# Patient Record
Sex: MALE | Race: WHITE | NOT HISPANIC OR LATINO | URBAN - METROPOLITAN AREA
[De-identification: names, ages, dates, MRNs, and addresses within clinical notes are randomized per-mention and may not be internally consistent; named-entity substitution may affect disease eponyms.]

---

## 2018-07-02 ENCOUNTER — EMERGENCY (EMERGENCY)
Facility: HOSPITAL | Age: 63
LOS: 0 days | Discharge: HOME | End: 2018-07-02
Attending: EMERGENCY MEDICINE | Admitting: EMERGENCY MEDICINE

## 2018-07-02 VITALS
OXYGEN SATURATION: 100 % | TEMPERATURE: 98 F | RESPIRATION RATE: 16 BRPM | DIASTOLIC BLOOD PRESSURE: 100 MMHG | SYSTOLIC BLOOD PRESSURE: 180 MMHG | HEART RATE: 78 BPM

## 2018-07-02 VITALS
TEMPERATURE: 98 F | HEART RATE: 84 BPM | OXYGEN SATURATION: 97 % | HEIGHT: 70 IN | DIASTOLIC BLOOD PRESSURE: 126 MMHG | SYSTOLIC BLOOD PRESSURE: 195 MMHG | RESPIRATION RATE: 18 BRPM | WEIGHT: 250 LBS

## 2018-07-02 DIAGNOSIS — B97.89 OTHER VIRAL AGENTS AS THE CAUSE OF DISEASES CLASSIFIED ELSEWHERE: ICD-10-CM

## 2018-07-02 DIAGNOSIS — J06.9 ACUTE UPPER RESPIRATORY INFECTION, UNSPECIFIED: ICD-10-CM

## 2018-07-02 DIAGNOSIS — R03.0 ELEVATED BLOOD-PRESSURE READING, WITHOUT DIAGNOSIS OF HYPERTENSION: ICD-10-CM

## 2018-07-02 DIAGNOSIS — I10 ESSENTIAL (PRIMARY) HYPERTENSION: ICD-10-CM

## 2018-07-02 NOTE — ED PROVIDER NOTE - CARE PLAN
Principal Discharge DX:	Elevated BP without diagnosis of hypertension  Secondary Diagnosis:	Viral upper respiratory tract infection

## 2018-07-02 NOTE — ED PROVIDER NOTE - OBJECTIVE STATEMENT
61 yo M no pmh presents with elevated bp. States that he has had a upper respiratory infection x 1 week with non productive cough and nasal congestion. Went to urgent care center yesterday was started on a z pack and tensilon pearls. While there was noted to have elevated BP which was still elevated today at a local pharmacy. Called his pmd who he had not seen since october 2016 who told him to come to the ED. no fever, no cp, no headache, no n/v/d, no abdominal pain. pmd is Dr. Deleon.

## 2018-07-02 NOTE — ED PROVIDER NOTE - NS ED ROS FT
Eyes:  No visual changes, eye pain or discharge.  ENMT:  no sore throat, + congestion and runny nose x 1 week   Cardiac:  No chest pain, SOB   Respiratory:  + cough x 1 week non productive. started on z pack yesterday   GI:  No nausea, vomiting, diarrhea or abdominal pain.  :  No dysuria, frequency or burning.  MS:  No joint pain or back pain.  Neuro:  No headache or weakness.    Skin:  No skin rash.   Endocrine: No history of thyroid disease or diabetes.

## 2018-07-02 NOTE — ED PROVIDER NOTE - PHYSICAL EXAMINATION
CONSTITUTIONAL: Well-developed; well-nourished; in no acute distress.   SKIN: warm, dry  HEAD: Normocephalic; atraumatic.  EYES: PERRL, no conjunctival erythema  ENT: No nasal discharge; airway clear. non erythematous pharynx, no exudates.   NECK: Supple; non tender.  CARD: S1, S2 normal;  Regular rate and rhythm.   RESP: No wheezes, rales or rhonchi.  ABD: soft ntnd  EXT: Normal ROM.    LYMPH: No acute cervical adenopathy.  NEURO: Alert, oriented, grossly unremarkable

## 2018-07-02 NOTE — ED ADULT TRIAGE NOTE - CHIEF COMPLAINT QUOTE
pt states I was at an urgent care center yesterday treated for a cold and they said my bp was high. I had it rechecked today at the pharmacy and it was still high 190/90.

## 2018-07-02 NOTE — ED PROVIDER NOTE - ATTENDING CONTRIBUTION TO CARE
63 y/o M with no PMH presents with cough, congestion and elevated BP reading. Patient had BP checked at the pharmacy today. Patient's PMD on vacation so patient came to the ED. He denies any complaints. No CP, SOB. EXAM: well appearing, lungs CTA, no mumurs, rubs. No edema. Plan: CX, ECG, will start hctz. Patient will follow-up with PMD next week.

## 2020-02-13 PROBLEM — Z00.00 ENCOUNTER FOR PREVENTIVE HEALTH EXAMINATION: Status: ACTIVE | Noted: 2020-02-13

## 2020-02-14 ENCOUNTER — APPOINTMENT (OUTPATIENT)
Dept: ORTHOPEDIC SURGERY | Facility: CLINIC | Age: 65
End: 2020-02-14
Payer: COMMERCIAL

## 2020-02-14 DIAGNOSIS — Z87.39 PERSONAL HISTORY OF OTHER DISEASES OF THE MUSCULOSKELETAL SYSTEM AND CONNECTIVE TISSUE: ICD-10-CM

## 2020-02-14 DIAGNOSIS — Z86.79 PERSONAL HISTORY OF OTHER DISEASES OF THE CIRCULATORY SYSTEM: ICD-10-CM

## 2020-02-14 DIAGNOSIS — Z86.69 PERSONAL HISTORY OF OTHER DISEASES OF THE NERVOUS SYSTEM AND SENSE ORGANS: ICD-10-CM

## 2020-02-14 PROCEDURE — 99204 OFFICE O/P NEW MOD 45 MIN: CPT

## 2020-02-14 RX ORDER — ACETAMINOPHEN 325 MG
TABLET ORAL
Refills: 0 | Status: ACTIVE | COMMUNITY

## 2020-02-14 RX ORDER — AMLODIPINE BESYLATE 5 MG/1
5 TABLET ORAL
Refills: 0 | Status: ACTIVE | COMMUNITY

## 2020-02-14 RX ORDER — LOSARTAN POTASSIUM 100 MG/1
100 TABLET, FILM COATED ORAL
Refills: 0 | Status: ACTIVE | COMMUNITY

## 2020-02-14 RX ORDER — MULTIVITAMIN
TABLET ORAL
Refills: 0 | Status: ACTIVE | COMMUNITY

## 2020-02-14 NOTE — ASSESSMENT
[FreeTextEntry1] : Pt comes in for severe arthritis in his left hip with tolerable symptoms. His symptoms are intermittent and he has been taking OTC NSAIDs on a PRN basis. His pain is not severe enough or frequent enough to justify hip replacement surgery. He would like to try a Rx NSAID to see if it will improve how he is feeling. We will therefore start him on Celebrex and see him back in 3-4 weeks.

## 2020-02-14 NOTE — HISTORY OF PRESENT ILLNESS
[Pain Location] : pain [] : left hip [7] : a maximum pain level of 7/10 [Worsening] : worsening [Walking] : walking [Standing] : standing [Constant] : ~He/She~ states the symptoms seem to be constant [de-identified] : 64 year old male presents to the office today complaining of bilateral knee pain, left knee more painful than right. He has xrays done at [facility] on his hip and his knee. Pt states there is pain in his groin that radiates down his left leg into his knee. Pt reports pain that is sharp in nature. He denies any buckling, locking, or swelling, but does report a loss of motion in the hip. He reports being able to ambulate about 2-3 blocks before pain stops him. He has pain and difficulty with negotiating stairs. He also reports difficulty with putting socks and shoes on and getting in and out of the car. He reports taking Ibuprofen and Tylenol for pain with only some relief. Pt reports doing physical therapy, but this was for his back. Pt is here today to discuss his options for pain relief.

## 2020-02-14 NOTE — PHYSICAL EXAM
[de-identified] : General appearance: well nourished and hydrated, pleasant, alert and oriented x 3, cooperative.\par Cardiovascular: no apparent abnormalities, no lower leg edema, no varicosities, pedal pulses are palpable.\par Neurologic: sensation is normal, no muscle weakness in upper or lower extremities\par Dermatologic no apparent skin lesions, moist, warm, no rash.\par Gait: nonantalgic.\par \par Left knee\par Inspection: no effusion or erythema.\par Wounds: none.\par Alignment: normal.\par Palpation: no specific tenderness on palpation.\par ROM:0-120 degrees \par Ligamentous laxity: all ligaments appear stable\par Muscle Test: good quad strength.\par \par Right knee\par Inspection: no effusion or erythema.\par Wounds: none.\par Alignment: normal.\par Palpation: no specific tenderness on palpation.\par ROM: 0-120 degrees \par Ligamentous laxity: all ligaments appear stable\par Muscle Test: good quad strength.\par \par Left hip\par Inspection: No swelling or ecchymosis.\par Wounds: none.\par Palpation: non-tender.\par Stability: no instability.\par Strength: 5/5 all motor groups.\par ROM: Flexion to 60 degrees, with a fixed ER contracture of 30 degrees\par Leg length: equal.\par \par Right hip\par Inspection: No swelling or ecchymosis.\par Wounds: none.\par Palpation: non-tender.\par Stability: no instability.\par Strength: 5/5 all motor groups.\par ROM: Flexion to 60 degrees, with a fixed ER contracture of 30 degrees\par Leg length: equal. [de-identified] : Radiographs from outside of the knees shows well maintained joint spaces \par \par Radiographs from outside of the hip shows complete loss of the joint space in the left hip

## 2020-03-20 ENCOUNTER — APPOINTMENT (OUTPATIENT)
Dept: ORTHOPEDIC SURGERY | Facility: CLINIC | Age: 65
End: 2020-03-20

## 2020-04-23 RX ORDER — CELECOXIB 200 MG/1
200 CAPSULE ORAL
Qty: 30 | Refills: 0 | Status: DISCONTINUED | COMMUNITY
Start: 2020-02-14 | End: 2020-04-23

## 2020-11-23 ENCOUNTER — APPOINTMENT (OUTPATIENT)
Dept: ORTHOPEDIC SURGERY | Facility: CLINIC | Age: 65
End: 2020-11-23
Payer: COMMERCIAL

## 2020-11-23 DIAGNOSIS — M25.561 PAIN IN RIGHT KNEE: ICD-10-CM

## 2020-11-23 DIAGNOSIS — M25.562 PAIN IN LEFT KNEE: ICD-10-CM

## 2020-11-23 PROCEDURE — 99213 OFFICE O/P EST LOW 20 MIN: CPT

## 2020-11-23 PROCEDURE — 73562 X-RAY EXAM OF KNEE 3: CPT | Mod: LT

## 2020-11-23 NOTE — HISTORY OF PRESENT ILLNESS
[Pain Location] : pain [de-identified] : 2/14/2020 - 64 year old male presents to the office today complaining of bilateral knee pain, left knee more painful than right. He has xrays done at [facility] on his hip and his knee. Pt states there is pain in his groin that radiates down his left leg into his knee. Pt reports pain that is sharp in nature. He denies any buckling, locking, or swelling, but does report a loss of motion in the hip. He reports being able to ambulate about 2-3 blocks before pain stops him. He has pain and difficulty with negotiating stairs. He also reports difficulty with putting socks and shoes on and getting in and out of the car. He reports taking Ibuprofen and Tylenol for pain with only some relief. Pt reports doing physical therapy, but this was for his back. Pt is here today to discuss his options for pain relief. \par \par 11/23/2020 - 64 year old male presents to the office today complaining of left knee pain that has been worsening in the last 3 months. When the patient was last here, we told him about his severely arthritic left hip, but patient denies any groin pain only left knee pain. Patient reports taking Ibuprofen for pain with relief. Patient is here today to discuss his options for pain relief.

## 2020-11-23 NOTE — PHYSICAL EXAM
[de-identified] : General appearance: well nourished and hydrated, pleasant, alert and oriented x 3, cooperative.\par Cardiovascular: no apparent abnormalities, no lower leg edema, no varicosities, pedal pulses are palpable.\par Neurologic: sensation is normal, no muscle weakness in upper or lower extremities\par Dermatologic no apparent skin lesions, moist, warm, no rash.\par Gait: nonantalgic.\par \par Left knee\par Inspection: no effusion or erythema.\par Wounds: none.\par Alignment: normal.\par Palpation: no specific tenderness on palpation.\par ROM:0-120 degrees \par Ligamentous laxity: all ligaments appear stable\par Muscle Test: good quad strength.\par \par Right knee\par Inspection: no effusion or erythema.\par Wounds: none.\par Alignment: normal.\par Palpation: no specific tenderness on palpation.\par ROM: 0-120 degrees \par Ligamentous laxity: all ligaments appear stable\par Muscle Test: good quad strength.\par \par Left hip\par Inspection: No swelling or ecchymosis.\par Wounds: none.\par Palpation: non-tender.\par Stability: no instability.\par Strength: 5/5 all motor groups.\par ROM: Flexion to 60 degrees, with a fixed ER contracture of 30 degrees\par Leg length: equal.\par \par Right hip\par Inspection: No swelling or ecchymosis.\par Wounds: none.\par Palpation: non-tender.\par Stability: no instability.\par Strength: 5/5 all motor groups.\par ROM: Flexion to 60 degrees, with a fixed ER contracture of 30 degrees\par Leg length: equal. [de-identified] : Radiographs done today AP lateral and skyline of the left knee shows well maintained joint spaces \par \par 2/14/2020- Radiographs from outside of the left hip shows complete loss of the joint space in the left hip

## 2020-11-23 NOTE — ASSESSMENT
[FreeTextEntry1] : 64 year old male presents for pain in the left knee which is responsive to 400 mg of ibuprofen each morning. The exam of the knee is benign and while ranging left hip i am unable to illicit any referred pain to the knee. Having said that i still feel his complaints of knee pain are coming from his arthritic left hip. He has end stage arthritis that is controlled with 400 mg of ibuprofen. I feel this is a reasonable Tx, he will check with PCP to make sure this is ok. We will see him back in 1 year.

## 2020-12-11 ENCOUNTER — APPOINTMENT (OUTPATIENT)
Dept: ORTHOPEDIC SURGERY | Facility: CLINIC | Age: 65
End: 2020-12-11
Payer: COMMERCIAL

## 2020-12-11 DIAGNOSIS — M16.12 UNILATERAL PRIMARY OSTEOARTHRITIS, LEFT HIP: ICD-10-CM

## 2020-12-11 PROCEDURE — 99072 ADDL SUPL MATRL&STAF TM PHE: CPT

## 2020-12-11 PROCEDURE — 99214 OFFICE O/P EST MOD 30 MIN: CPT

## 2020-12-11 NOTE — HISTORY OF PRESENT ILLNESS
[de-identified] : 2/14/2020 - 64 year old male presents to the office today complaining of bilateral knee pain, left knee more painful than right. He has xrays done at [facility] on his hip and his knee. Pt states there is pain in his groin that radiates down his left leg into his knee. Pt reports pain that is sharp in nature. He denies any buckling, locking, or swelling, but does report a loss of motion in the hip. He reports being able to ambulate about 2-3 blocks before pain stops him. He has pain and difficulty with negotiating stairs. He also reports difficulty with putting socks and shoes on and getting in and out of the car. He reports taking Ibuprofen and Tylenol for pain with only some relief. Pt reports doing physical therapy, but this was for his back. Pt is here today to discuss his options for pain relief. \par \par 11/23/2020 - 64 year old male presents to the office today complaining of left knee pain that has been worsening in the last 3 months. When the patient was last here, we told him about his severely arthritic left hip, but patient denies any groin pain only left knee pain. Patient reports taking Ibuprofen for pain with relief. Patient is here today to discuss his options for pain relief.\par \par 12/11/2020- 64 year old male presents to the office for follow up of his painful left arthritic hip.

## 2020-12-11 NOTE — PHYSICAL EXAM
[de-identified] : General appearance: well nourished and hydrated, pleasant, alert and oriented x 3, cooperative.\par Cardiovascular: no apparent abnormalities, no lower leg edema, no varicosities, pedal pulses are palpable.\par Neurologic: sensation is normal, no muscle weakness in upper or lower extremities\par Dermatologic no apparent skin lesions, moist, warm, no rash.\par Gait: nonantalgic.\par \par Left knee\par Inspection: no effusion or erythema.\par Wounds: none.\par Alignment: normal.\par Palpation: no specific tenderness on palpation.\par ROM:0-120 degrees \par Ligamentous laxity: all ligaments appear stable\par Muscle Test: good quad strength.\par \par Right knee\par Inspection: no effusion or erythema.\par Wounds: none.\par Alignment: normal.\par Palpation: no specific tenderness on palpation.\par ROM: 0-120 degrees \par Ligamentous laxity: all ligaments appear stable\par Muscle Test: good quad strength.\par \par Left hip\par Inspection: No swelling or ecchymosis.\par Wounds: none.\par Palpation: non-tender.\par Stability: no instability.\par Strength: 5/5 all motor groups.\par ROM: Flexion to 60 degrees, with a fixed ER contracture of 30 degrees\par Leg length: equal.\par \par Right hip\par Inspection: No swelling or ecchymosis.\par Wounds: none.\par Palpation: non-tender.\par Stability: no instability.\par Strength: 5/5 all motor groups.\par ROM: Flexion to 60 degrees, with a fixed ER contracture of 30 degrees\par Leg length: equal. [de-identified] : 11/23/2020- Radiographs done today AP lateral and skyline of the left knee shows well maintained joint spaces \par \par 2/14/2020- Radiographs from outside regional radiology of the left hip shows complete loss of the joint space in the left hip

## 2020-12-11 NOTE — ASSESSMENT
[FreeTextEntry1] : Pt presents for follow up on his left painful arthritic hip. The pain is in the hip down the thigh to the knee. It has progressed since the last time we saw him. We discussed the Tx options, Sx and non Sx. He has decided he would like to proceed with an elective LTH replacement. He will see him PCP for medical evaluation prior to S and be scheduled at his earliest possible convenience. \par \par The risks, benefits, alternatives of treatment, and aftercare precautions following joint replacement surgery were reviewed with the patient and all questions were answered. Models were used, radiographs reviewed and a brochure was given to the patient. The implant type utilized, including bearing surfaces fixation techniques were reviewed in detail, and the patient chose to proceed with elective joint replacement surgery. The patient's body mass index was recorded in my office notes, and for those patients whose  body mass index of greater than 30, I recommended that they review a weight reduction program with their internist. The importance of smoking cessation was reviewed with all patient's, and for those patients who still smoke, I recommended that they review a smoking cessation program with their internist.

## 2020-12-18 ENCOUNTER — RESULT REVIEW (OUTPATIENT)
Age: 65
End: 2020-12-18

## 2020-12-18 ENCOUNTER — OUTPATIENT (OUTPATIENT)
Dept: OUTPATIENT SERVICES | Facility: HOSPITAL | Age: 65
LOS: 1 days | Discharge: HOME | End: 2020-12-18
Payer: COMMERCIAL

## 2020-12-18 VITALS
RESPIRATION RATE: 16 BRPM | WEIGHT: 207.01 LBS | HEART RATE: 78 BPM | SYSTOLIC BLOOD PRESSURE: 172 MMHG | DIASTOLIC BLOOD PRESSURE: 92 MMHG | HEIGHT: 70 IN | OXYGEN SATURATION: 96 % | TEMPERATURE: 97 F

## 2020-12-18 VITALS
HEIGHT: 70 IN | TEMPERATURE: 97 F | RESPIRATION RATE: 16 BRPM | HEART RATE: 78 BPM | WEIGHT: 207.01 LBS | OXYGEN SATURATION: 96 % | SYSTOLIC BLOOD PRESSURE: 172 MMHG | DIASTOLIC BLOOD PRESSURE: 92 MMHG

## 2020-12-18 DIAGNOSIS — M16.12 UNILATERAL PRIMARY OSTEOARTHRITIS, LEFT HIP: ICD-10-CM

## 2020-12-18 DIAGNOSIS — Z01.818 ENCOUNTER FOR OTHER PREPROCEDURAL EXAMINATION: ICD-10-CM

## 2020-12-18 LAB
A1C WITH ESTIMATED AVERAGE GLUCOSE RESULT: 6 % — HIGH (ref 4–5.6)
ALBUMIN SERPL ELPH-MCNC: 4.3 G/DL — SIGNIFICANT CHANGE UP (ref 3.5–5.2)
ALP SERPL-CCNC: 125 U/L — HIGH (ref 30–115)
ALT FLD-CCNC: 11 U/L — SIGNIFICANT CHANGE UP (ref 0–41)
ANION GAP SERPL CALC-SCNC: 12 MMOL/L — SIGNIFICANT CHANGE UP (ref 7–14)
APTT BLD: 33.2 SEC — SIGNIFICANT CHANGE UP (ref 27–39.2)
AST SERPL-CCNC: 12 U/L — SIGNIFICANT CHANGE UP (ref 0–41)
BASOPHILS # BLD AUTO: 0.05 K/UL — SIGNIFICANT CHANGE UP (ref 0–0.2)
BASOPHILS NFR BLD AUTO: 0.7 % — SIGNIFICANT CHANGE UP (ref 0–1)
BILIRUB SERPL-MCNC: 0.6 MG/DL — SIGNIFICANT CHANGE UP (ref 0.2–1.2)
BLD GP AB SCN SERPL QL: SIGNIFICANT CHANGE UP
BUN SERPL-MCNC: 16 MG/DL — SIGNIFICANT CHANGE UP (ref 10–20)
CALCIUM SERPL-MCNC: 9.5 MG/DL — SIGNIFICANT CHANGE UP (ref 8.5–10.1)
CHLORIDE SERPL-SCNC: 102 MMOL/L — SIGNIFICANT CHANGE UP (ref 98–110)
CO2 SERPL-SCNC: 25 MMOL/L — SIGNIFICANT CHANGE UP (ref 17–32)
CREAT SERPL-MCNC: 0.7 MG/DL — SIGNIFICANT CHANGE UP (ref 0.7–1.5)
EOSINOPHIL # BLD AUTO: 0.47 K/UL — SIGNIFICANT CHANGE UP (ref 0–0.7)
EOSINOPHIL NFR BLD AUTO: 6.8 % — SIGNIFICANT CHANGE UP (ref 0–8)
ESTIMATED AVERAGE GLUCOSE: 126 MG/DL — HIGH (ref 68–114)
GLUCOSE SERPL-MCNC: 106 MG/DL — HIGH (ref 70–99)
HCT VFR BLD CALC: 44.7 % — SIGNIFICANT CHANGE UP (ref 42–52)
HGB BLD-MCNC: 14.4 G/DL — SIGNIFICANT CHANGE UP (ref 14–18)
IMM GRANULOCYTES NFR BLD AUTO: 0.1 % — SIGNIFICANT CHANGE UP (ref 0.1–0.3)
INR BLD: 0.97 RATIO — SIGNIFICANT CHANGE UP (ref 0.65–1.3)
LYMPHOCYTES # BLD AUTO: 1.32 K/UL — SIGNIFICANT CHANGE UP (ref 1.2–3.4)
LYMPHOCYTES # BLD AUTO: 19 % — LOW (ref 20.5–51.1)
MCHC RBC-ENTMCNC: 27.5 PG — SIGNIFICANT CHANGE UP (ref 27–31)
MCHC RBC-ENTMCNC: 32.2 G/DL — SIGNIFICANT CHANGE UP (ref 32–37)
MCV RBC AUTO: 85.3 FL — SIGNIFICANT CHANGE UP (ref 80–94)
MONOCYTES # BLD AUTO: 0.56 K/UL — SIGNIFICANT CHANGE UP (ref 0.1–0.6)
MONOCYTES NFR BLD AUTO: 8.1 % — SIGNIFICANT CHANGE UP (ref 1.7–9.3)
MRSA PCR RESULT.: NEGATIVE — SIGNIFICANT CHANGE UP
NEUTROPHILS # BLD AUTO: 4.53 K/UL — SIGNIFICANT CHANGE UP (ref 1.4–6.5)
NEUTROPHILS NFR BLD AUTO: 65.3 % — SIGNIFICANT CHANGE UP (ref 42.2–75.2)
NRBC # BLD: 0 /100 WBCS — SIGNIFICANT CHANGE UP (ref 0–0)
PLATELET # BLD AUTO: 252 K/UL — SIGNIFICANT CHANGE UP (ref 130–400)
POTASSIUM SERPL-MCNC: 4.6 MMOL/L — SIGNIFICANT CHANGE UP (ref 3.5–5)
POTASSIUM SERPL-SCNC: 4.6 MMOL/L — SIGNIFICANT CHANGE UP (ref 3.5–5)
PROT SERPL-MCNC: 7.2 G/DL — SIGNIFICANT CHANGE UP (ref 6–8)
PROTHROM AB SERPL-ACNC: 11.2 SEC — SIGNIFICANT CHANGE UP (ref 9.95–12.87)
RBC # BLD: 5.24 M/UL — SIGNIFICANT CHANGE UP (ref 4.7–6.1)
RBC # FLD: 12.8 % — SIGNIFICANT CHANGE UP (ref 11.5–14.5)
SODIUM SERPL-SCNC: 139 MMOL/L — SIGNIFICANT CHANGE UP (ref 135–146)
WBC # BLD: 6.94 K/UL — SIGNIFICANT CHANGE UP (ref 4.8–10.8)
WBC # FLD AUTO: 6.94 K/UL — SIGNIFICANT CHANGE UP (ref 4.8–10.8)

## 2020-12-18 PROCEDURE — 93010 ELECTROCARDIOGRAM REPORT: CPT

## 2020-12-18 PROCEDURE — 73502 X-RAY EXAM HIP UNI 2-3 VIEWS: CPT | Mod: 26,LT

## 2020-12-18 PROCEDURE — 71046 X-RAY EXAM CHEST 2 VIEWS: CPT | Mod: 26

## 2020-12-18 NOTE — H&P PST ADULT - NSICDXPASTMEDICALHX_GEN_ALL_CORE_FT
PAST MEDICAL HISTORY:  Hypertension     No pertinent past medical history     Osteoarthritis (arthritis due to wear and tear of joints)

## 2020-12-18 NOTE — H&P PST ADULT - HISTORY OF PRESENT ILLNESS
Anesthesia Alert  NO--Difficult Airway  NO--History of neck surgery or radiation  NO--Limited ROM of neck  NO--History of Malignant hyperthermia  NO--Personal or family history of Pseudocholinesterase deficiency  NO--Prior Anesthesia Complication  NO--Latex Allergy  NO--Loose teeth  NO--History of Rheumatoid Arthritis  NO--BECCA  NO--Other_____    PT is very nervous; reviewed instructions with wife and pt; went over pre-op gatorade instructions as well as meds/nasal culture    FOS: 1-2    mALLAMAPTI: 3    Denies exposure to covid-19    FROM    PT had pain for a few years that radiated down to left knee; takes anti-inflammatory for pain; pain can range from 2-8/10

## 2020-12-18 NOTE — H&P PST ADULT - HISTORY OF PRESENT ILLNESS
Anesthesia Alert  NO--Difficult Airway  NO--History of neck surgery or radiation  NO--Limited ROM of neck  NO--History of Malignant hyperthermia  NO--Personal or family history of Pseudocholinesterase deficiency  NO--Prior Anesthesia Complication  NO--Latex Allergy  NO--Loose teeth  NO--History of Rheumatoid Arthritis  NO--BECCA  NO--Other_____    PT is very nervous; wife is with patient  -reviewed hip replacement instructions; went over meds (tylenol and celebrex) and nasal swab culture    Denies every having surgery; denies exposure to COVID-19    Mallamapti: 3    FROM    FOS: 1-2    PT had hip pain for a few years; now it's affecting his ability to walk; pain radiates down to knee; sometimes pain is 2/10 other times it can reach 8/10; pt take anti-inflammatory (meloxicam) which helps

## 2020-12-18 NOTE — H&P PST ADULT - REASON FOR ADMISSION
64 y.o. M scheduled for left total hip replacement (ssi bundle) with Dr. Easton on 12/22/20 at [facility]

## 2020-12-18 NOTE — H&P PST ADULT - REASON FOR ADMISSION
64 y.o. M scheduled for left total hip replacement with Dr. Easton on 12/22/20 at [facility]

## 2020-12-19 ENCOUNTER — LABORATORY RESULT (OUTPATIENT)
Age: 65
End: 2020-12-19

## 2020-12-19 ENCOUNTER — OUTPATIENT (OUTPATIENT)
Dept: OUTPATIENT SERVICES | Facility: HOSPITAL | Age: 65
LOS: 1 days | Discharge: HOME | End: 2020-12-19

## 2020-12-19 DIAGNOSIS — Z11.59 ENCOUNTER FOR SCREENING FOR OTHER VIRAL DISEASES: ICD-10-CM

## 2020-12-19 PROBLEM — M19.90 UNSPECIFIED OSTEOARTHRITIS, UNSPECIFIED SITE: Chronic | Status: ACTIVE | Noted: 2020-12-18

## 2020-12-19 PROBLEM — I10 ESSENTIAL (PRIMARY) HYPERTENSION: Chronic | Status: ACTIVE | Noted: 2020-12-18

## 2020-12-22 ENCOUNTER — RESULT REVIEW (OUTPATIENT)
Age: 65
End: 2020-12-22

## 2020-12-22 ENCOUNTER — APPOINTMENT (OUTPATIENT)
Dept: ORTHOPEDIC SURGERY | Facility: HOSPITAL | Age: 65
End: 2020-12-22
Payer: COMMERCIAL

## 2020-12-22 ENCOUNTER — INPATIENT (INPATIENT)
Facility: HOSPITAL | Age: 65
LOS: 0 days | Discharge: HOME HEALTH | End: 2020-12-23
Attending: ORTHOPAEDIC SURGERY | Admitting: ORTHOPAEDIC SURGERY
Payer: COMMERCIAL

## 2020-12-22 DIAGNOSIS — M16.12 UNILATERAL PRIMARY OSTEOARTHRITIS, LEFT HIP: ICD-10-CM

## 2020-12-22 DIAGNOSIS — R73.9 HYPERGLYCEMIA, UNSPECIFIED: ICD-10-CM

## 2020-12-22 DIAGNOSIS — I10 ESSENTIAL (PRIMARY) HYPERTENSION: ICD-10-CM

## 2020-12-22 DIAGNOSIS — Z79.82 LONG TERM (CURRENT) USE OF ASPIRIN: ICD-10-CM

## 2020-12-22 LAB
BLD GP AB SCN SERPL QL: SIGNIFICANT CHANGE UP
GLUCOSE BLDC GLUCOMTR-MCNC: 115 MG/DL — HIGH (ref 70–99)
GLUCOSE BLDC GLUCOMTR-MCNC: 200 MG/DL — HIGH (ref 70–99)
HCT VFR BLD CALC: 38.5 % — LOW (ref 42–52)
HGB BLD-MCNC: 12.8 G/DL — LOW (ref 14–18)
MCHC RBC-ENTMCNC: 28.3 PG — SIGNIFICANT CHANGE UP (ref 27–31)
MCHC RBC-ENTMCNC: 33.2 G/DL — SIGNIFICANT CHANGE UP (ref 32–37)
MCV RBC AUTO: 85.2 FL — SIGNIFICANT CHANGE UP (ref 80–94)
NRBC # BLD: 0 /100 WBCS — SIGNIFICANT CHANGE UP (ref 0–0)
PLATELET # BLD AUTO: 226 K/UL — SIGNIFICANT CHANGE UP (ref 130–400)
RBC # BLD: 4.52 M/UL — LOW (ref 4.7–6.1)
RBC # FLD: 12.7 % — SIGNIFICANT CHANGE UP (ref 11.5–14.5)
WBC # BLD: 14.24 K/UL — HIGH (ref 4.8–10.8)
WBC # FLD AUTO: 14.24 K/UL — HIGH (ref 4.8–10.8)

## 2020-12-22 PROCEDURE — 88356 ANALYSIS NERVE: CPT | Mod: 26

## 2020-12-22 PROCEDURE — 99252 IP/OBS CONSLTJ NEW/EST SF 35: CPT

## 2020-12-22 PROCEDURE — 27130 TOTAL HIP ARTHROPLASTY: CPT | Mod: LT

## 2020-12-22 PROCEDURE — 88304 TISSUE EXAM BY PATHOLOGIST: CPT | Mod: 26

## 2020-12-22 PROCEDURE — 88311 DECALCIFY TISSUE: CPT | Mod: 26

## 2020-12-22 PROCEDURE — 88313 SPECIAL STAINS GROUP 2: CPT | Mod: 26

## 2020-12-22 PROCEDURE — 88342 IMHCHEM/IMCYTCHM 1ST ANTB: CPT | Mod: 26,59

## 2020-12-22 PROCEDURE — 88360 TUMOR IMMUNOHISTOCHEM/MANUAL: CPT | Mod: 26

## 2020-12-22 PROCEDURE — 88365 INSITU HYBRIDIZATION (FISH): CPT | Mod: 26

## 2020-12-22 RX ORDER — ONDANSETRON 8 MG/1
4 TABLET, FILM COATED ORAL EVERY 6 HOURS
Refills: 0 | Status: DISCONTINUED | OUTPATIENT
Start: 2020-12-22 | End: 2020-12-23

## 2020-12-22 RX ORDER — SODIUM CHLORIDE 9 MG/ML
1000 INJECTION INTRAMUSCULAR; INTRAVENOUS; SUBCUTANEOUS
Refills: 0 | Status: DISCONTINUED | OUTPATIENT
Start: 2020-12-22 | End: 2020-12-23

## 2020-12-22 RX ORDER — ONDANSETRON 8 MG/1
4 TABLET, FILM COATED ORAL ONCE
Refills: 0 | Status: DISCONTINUED | OUTPATIENT
Start: 2020-12-22 | End: 2020-12-22

## 2020-12-22 RX ORDER — TRAMADOL HYDROCHLORIDE 50 MG/1
50 TABLET ORAL EVERY 4 HOURS
Refills: 0 | Status: DISCONTINUED | OUTPATIENT
Start: 2020-12-22 | End: 2020-12-23

## 2020-12-22 RX ORDER — PANTOPRAZOLE SODIUM 20 MG/1
40 TABLET, DELAYED RELEASE ORAL
Refills: 0 | Status: DISCONTINUED | OUTPATIENT
Start: 2020-12-22 | End: 2020-12-23

## 2020-12-22 RX ORDER — LOSARTAN POTASSIUM 100 MG/1
0 TABLET, FILM COATED ORAL
Qty: 0 | Refills: 0 | DISCHARGE

## 2020-12-22 RX ORDER — FERROUS SULFATE 325(65) MG
325 TABLET ORAL DAILY
Refills: 0 | Status: DISCONTINUED | OUTPATIENT
Start: 2020-12-22 | End: 2020-12-23

## 2020-12-22 RX ORDER — CELECOXIB 200 MG/1
200 CAPSULE ORAL EVERY 12 HOURS
Refills: 0 | Status: DISCONTINUED | OUTPATIENT
Start: 2020-12-23 | End: 2020-12-23

## 2020-12-22 RX ORDER — LOSARTAN POTASSIUM 100 MG/1
100 TABLET, FILM COATED ORAL DAILY
Refills: 0 | Status: DISCONTINUED | OUTPATIENT
Start: 2020-12-23 | End: 2020-12-23

## 2020-12-22 RX ORDER — DEXAMETHASONE 0.5 MG/5ML
4 ELIXIR ORAL ONCE
Refills: 0 | Status: COMPLETED | OUTPATIENT
Start: 2020-12-23 | End: 2020-12-23

## 2020-12-22 RX ORDER — CEFAZOLIN SODIUM 1 G
2000 VIAL (EA) INJECTION EVERY 8 HOURS
Refills: 0 | Status: COMPLETED | OUTPATIENT
Start: 2020-12-22 | End: 2020-12-22

## 2020-12-22 RX ORDER — ACETAMINOPHEN 500 MG
1000 TABLET ORAL ONCE
Refills: 0 | Status: COMPLETED | OUTPATIENT
Start: 2020-12-22 | End: 2020-12-22

## 2020-12-22 RX ORDER — ACETAMINOPHEN 500 MG
650 TABLET ORAL EVERY 6 HOURS
Refills: 0 | Status: DISCONTINUED | OUTPATIENT
Start: 2020-12-22 | End: 2020-12-23

## 2020-12-22 RX ORDER — CHLORHEXIDINE GLUCONATE 213 G/1000ML
1 SOLUTION TOPICAL
Refills: 0 | Status: DISCONTINUED | OUTPATIENT
Start: 2020-12-22 | End: 2020-12-23

## 2020-12-22 RX ORDER — AMLODIPINE BESYLATE 2.5 MG/1
0 TABLET ORAL
Qty: 0 | Refills: 0 | DISCHARGE

## 2020-12-22 RX ORDER — ASPIRIN/CALCIUM CARB/MAGNESIUM 324 MG
81 TABLET ORAL EVERY 12 HOURS
Refills: 0 | Status: DISCONTINUED | OUTPATIENT
Start: 2020-12-22 | End: 2020-12-23

## 2020-12-22 RX ORDER — AMLODIPINE BESYLATE 2.5 MG/1
5 TABLET ORAL DAILY
Refills: 0 | Status: DISCONTINUED | OUTPATIENT
Start: 2020-12-23 | End: 2020-12-23

## 2020-12-22 RX ORDER — SODIUM CHLORIDE 9 MG/ML
1000 INJECTION, SOLUTION INTRAVENOUS
Refills: 0 | Status: DISCONTINUED | OUTPATIENT
Start: 2020-12-22 | End: 2020-12-22

## 2020-12-22 RX ORDER — HYDROMORPHONE HYDROCHLORIDE 2 MG/ML
0.5 INJECTION INTRAMUSCULAR; INTRAVENOUS; SUBCUTANEOUS
Refills: 0 | Status: DISCONTINUED | OUTPATIENT
Start: 2020-12-22 | End: 2020-12-22

## 2020-12-22 RX ORDER — SENNA PLUS 8.6 MG/1
2 TABLET ORAL AT BEDTIME
Refills: 0 | Status: DISCONTINUED | OUTPATIENT
Start: 2020-12-22 | End: 2020-12-23

## 2020-12-22 RX ORDER — CELECOXIB 200 MG/1
400 CAPSULE ORAL ONCE
Refills: 0 | Status: COMPLETED | OUTPATIENT
Start: 2020-12-22 | End: 2020-12-22

## 2020-12-22 RX ORDER — POLYETHYLENE GLYCOL 3350 17 G/17G
17 POWDER, FOR SOLUTION ORAL AT BEDTIME
Refills: 0 | Status: DISCONTINUED | OUTPATIENT
Start: 2020-12-22 | End: 2020-12-23

## 2020-12-22 RX ORDER — KETOROLAC TROMETHAMINE 30 MG/ML
15 SYRINGE (ML) INJECTION EVERY 6 HOURS
Refills: 0 | Status: DISCONTINUED | OUTPATIENT
Start: 2020-12-22 | End: 2020-12-23

## 2020-12-22 RX ADMIN — Medication 100 MILLIGRAM(S): at 16:25

## 2020-12-22 RX ADMIN — Medication 1000 MILLIGRAM(S): at 12:45

## 2020-12-22 RX ADMIN — Medication 650 MILLIGRAM(S): at 17:25

## 2020-12-22 RX ADMIN — Medication 81 MILLIGRAM(S): at 22:53

## 2020-12-22 RX ADMIN — SODIUM CHLORIDE 100 MILLILITER(S): 9 INJECTION INTRAMUSCULAR; INTRAVENOUS; SUBCUTANEOUS at 17:48

## 2020-12-22 RX ADMIN — CELECOXIB 400 MILLIGRAM(S): 200 CAPSULE ORAL at 06:29

## 2020-12-22 RX ADMIN — Medication 100 MILLIGRAM(S): at 23:01

## 2020-12-22 RX ADMIN — Medication 1000 MILLIGRAM(S): at 06:29

## 2020-12-22 RX ADMIN — Medication 650 MILLIGRAM(S): at 23:02

## 2020-12-22 RX ADMIN — CELECOXIB 400 MILLIGRAM(S): 200 CAPSULE ORAL at 12:45

## 2020-12-22 RX ADMIN — Medication 15 MILLIGRAM(S): at 17:48

## 2020-12-22 RX ADMIN — Medication 15 MILLIGRAM(S): at 23:02

## 2020-12-22 RX ADMIN — Medication 650 MILLIGRAM(S): at 23:01

## 2020-12-22 RX ADMIN — SODIUM CHLORIDE 75 MILLILITER(S): 9 INJECTION, SOLUTION INTRAVENOUS at 13:32

## 2020-12-22 RX ADMIN — Medication 15 MILLIGRAM(S): at 17:34

## 2020-12-22 RX ADMIN — Medication 325 MILLIGRAM(S): at 17:25

## 2020-12-22 RX ADMIN — Medication 15 MILLIGRAM(S): at 23:01

## 2020-12-22 RX ADMIN — PANTOPRAZOLE SODIUM 40 MILLIGRAM(S): 20 TABLET, DELAYED RELEASE ORAL at 17:25

## 2020-12-22 NOTE — PROGRESS NOTE ADULT - ASSESSMENT
s/p left thr pod 0     pain control   dvt proph   am labs   wbat   pt/ot   abx 24 hours   incentive   dispo planning

## 2020-12-22 NOTE — ASU PATIENT PROFILE, ADULT - PMH
Hypertension    No pertinent past medical history    Osteoarthritis (arthritis due to wear and tear of joints)

## 2020-12-22 NOTE — PHYSICAL THERAPY INITIAL EVALUATION ADULT - ADDITIONAL COMMENTS
Pt lives in [facility] with his wife. There are 6 steps with B rails on outside of house to enter, then 10 steps with rail on L side up to main level. Pt reports bedroom/bathroom on main level - no need to go up to loft. Pt reports ambulating without device prior to surgery - no RW or commode at home.

## 2020-12-22 NOTE — CHART NOTE - NSCHARTNOTEFT_GEN_A_CORE
PACU ANESTHESIA ADMISSION NOTE      Procedure: left THR  Post op diagnosis:  Left Hip OA    ____  Intubated  TV:______       Rate: ______      FiO2: ______    __x__  Patent Airway    __x__  Full return of protective reflexes    ___x_  Full recovery from anesthesia / back to baseline     Vitals:   T: 97.9          R:     18             BP:   93/52 repeat 105/56               Sat:    100%               P: 87      Mental Status:  ____x Awake   _____ Alert   _____x Drowsy   _____ Sedated    Nausea/Vomiting:  ___x NO  ______Yes,   See Post - Op Orders          Pain Scale (0-10):  ___0__    Treatment: _x___ None    ____ See Post - Op/PCA Orders    Post - Operative Fluids:   ____ Oral  x ____ See Post - Op Orders    Plan: Discharge:   ____Home       ___x__Floor     _____Critical Care    _____  Other:_________________    Comments: S/P CSE pt able to move BLE notes mild numbness BLE.  Epidural catheter removed with tip intact.

## 2020-12-22 NOTE — ASU PREOP CHECKLIST - BP NONINVASIVE SYSTOLIC (MM HG)
Patient has a family history of colon cancer.  She underwent her last colonoscopy on 6/22/2017 with sigmoid diverticulosis was found.  She did undergo an abdominal pelvic CT scan on 12/20/2019 showing a diffuse mild colitis. Patient returns for follow-up evaluation on 11/10/2020.  She was referred by her GYN, as she has lost 20 pounds unintentionally over the past 3 years.  Recent lab work did reveal a CRP of 2 and a BMP which was normal.  Overall she is feeling well, although she does complain of nausea in the morning, with occasional heartburn she denies any dysphagia, and her stools are normal on Colace.  She does have a family history of colon cancer, with 2 first cousins having colonic malignancy.  Any procedures will need to be done at the hospital, as she is a difficult intubation due to her anterior larynx and multiple neck surgeries.
162

## 2020-12-22 NOTE — CONSULT NOTE ADULT - SUBJECTIVE AND OBJECTIVE BOX
CC  S/P left Total hip replacement  HPI.  Patient reports left Hip pain is controlled.  Offers no other complaints    Constitutional: No fever, fatigue or weight loss.  Skin: No rash.  Eyes: No recent vision problems or eye pain.  ENT: No congestion, ear pain, or sore throat.  Endocrine: No thyroid problems.  Cardiovascular: No chest pain or palpation.  Respiratory: No cough, shortness of breath, congestion, or wheezing.  Gastrointestinal: No abdominal pain, nausea, vomiting, or diarrhea.  Genitourinary: No dysuria.  Musculoskeletal: No joint swelling.  Neurologic: No headache.           Allergies/Medications:   Allergies:        Allergies:  	No Known Allergies:     Home Medications:   * Patient Currently Takes Medications as of 18-Dec-2020 07:31 documented in Structured Notes  · 	losartan: Last Dose Taken:  , tab(s) orally once a day  · 	amLODIPine: Last Dose Taken:  , tab(s) orally once a day  · 	Aspir 81 oral delayed release tablet: Last Dose Taken:  , 1 tab(s) orally once a day  · 	Multiple Vitamins oral tablet: Last Dose Taken:  , 1 tab(s) orally once a day    PMH/PSH/FH/SH:    Past Medical, Past Surgical, and Family History:  PAST MEDICAL HISTORY:  Hypertension     No pertinent past medical history     Osteoarthritis (arthritis due to wear and tear of joints).     PAST SURGICAL HISTORY:  No significant past surgical history.     Social History:  · Marital Status	  · Lives With	spouse     Substance Use History:  · Substance Use	never used     Alcohol Use History:  · Have you ever consumed alcohol	never     Tobacco Usage:  · Tobacco Usage: Never smoker    Vital Signs Last 24 Hrs  T(C): 36.4 (22 Dec 2020 13:51), Max: 36.6 (22 Dec 2020 06:30)  T(F): 97.6 (22 Dec 2020 13:51), Max: 97.9 (22 Dec 2020 12:36)  HR: 82 (22 Dec 2020 15:38) (76 - 88)  BP: 132/74 (22 Dec 2020 15:38) (93/52 - 162/82)  BP(mean): --  RR: 18 (22 Dec 2020 15:38) (15 - 28)  SpO2: 98% (22 Dec 2020 13:51) (98% - 100%)        PHYSICAL EXAM-  GENERAL: NAD, well-groomed, well-developed  HEAD:  Atraumatic, Normocephalic  EYES: EOMI, PERRLA, conjunctiva and sclera clear  NECK: Supple, No JVD, Normal thyroid  NERVOUS SYSTEM:  Alert & Oriented X3, Motor Strength 5/5 B/L upper and lower extremities; DTRs 2+ intact and symmetric  CHEST/LUNG: Clear to percussion bilaterally; No rales, rhonchi, wheezing, or rubs  HEART: Regular rate and rhythm; No murmurs, rubs, or gallops  ABDOMEN: Soft, Nontender, Nondistended; Bowel sounds present  EXTREMITIES:  2+ Peripheral Pulses, No clubbing, cyanosis, or edema  SKIN: No rashes or lesions      Imaging Personally Reviewed:     [x ] YES  [ ] NO    Consultant(s) Notes Reviewed:  [x ] YES  [ ] NO    Care Discussed with Consultants/Other Providers [x ] YES  [ ] No medical contraindication for discharge

## 2020-12-22 NOTE — CONSULT NOTE ADULT - ASSESSMENT
Patient is 65 yo male presenting with      1. S/P Left Total hip replacement   Continue with pain management, DVT proph, and wound care as per Ortho.  PT/OT    2. HTN/HLD  -Continue with home medications  -Stable    #Progress Note Handoff  Pending (specify):  further care as per ortho  Family discussion:  plan of care was discussed with patient  in details.  all questions were answered.  seems to understand, and in agreement  Disposition:  unknown   Patient is 63 yo male presenting with      1. S/P Left Total hip replacement   Continue with pain management, DVT proph, and wound care as per Ortho.  PT/OT    2. HTN  -Continue with home medications  -Stable    #Progress Note Handoff  Pending (specify):  further care as per ortho  Family discussion:  plan of care was discussed with patient  in details.  all questions were answered.  seems to understand, and in agreement  Disposition:  unknown   Patient is 63 yo male presenting with      1. S/P Left Total hip replacement   Continue with pain management, DVT proph, and wound care as per Ortho.  PT/OT    2. HTN  -Continue with home medications  -Stable    3. Hyperglycemia  -HBA1C is 6.0  -Monitor POC    #Progress Note Handoff  Pending (specify):  further care as per ortho  Family discussion:  plan of care was discussed with patient  in details.  all questions were answered.  seems to understand, and in agreement  Disposition:  unknown

## 2020-12-22 NOTE — PHYSICAL THERAPY INITIAL EVALUATION ADULT - GENERAL OBSERVATIONS, REHAB EVAL
Pt encountered sitting in chair bedside, NAD, +Aquacel dressing L hip, ok to be seen by PT as confirmed by RN and pt agreeable. +chart reviewed

## 2020-12-22 NOTE — PROGRESS NOTE ADULT - SUBJECTIVE AND OBJECTIVE BOX
s/p left thr pod 0   pt seen at bedside is ambulating to bathroom and voiding   doing well no complaints pain controlled     Vital Signs Last 24 Hrs  T(C): 36.4 (22 Dec 2020 13:51), Max: 36.6 (22 Dec 2020 06:30)  T(F): 97.6 (22 Dec 2020 13:51), Max: 97.9 (22 Dec 2020 12:36)  HR: 82 (22 Dec 2020 15:38) (76 - 88)  BP: 132/74 (22 Dec 2020 15:38) (93/52 - 162/82)  BP(mean): --  RR: 18 (22 Dec 2020 15:38) (15 - 28)  SpO2: 98% (22 Dec 2020 13:51) (98% - 100%)    Vital Signs Last 24 Hrs  T(C): 36.4 (22 Dec 2020 13:51), Max: 36.6 (22 Dec 2020 06:30)  T(F): 97.6 (22 Dec 2020 13:51), Max: 97.9 (22 Dec 2020 12:36)  HR: 82 (22 Dec 2020 15:38) (76 - 88)  BP: 132/74 (22 Dec 2020 15:38) (93/52 - 162/82)  BP(mean): --  RR: 18 (22 Dec 2020 15:38) (15 - 28)  SpO2: 98% (22 Dec 2020 13:51) (98% - 100%)                          12.8   14.24 )-----------( 226      ( 22 Dec 2020 13:24 )             38.5       left hip: dressing c//d/i   nvid df/pf intact   no calf pain or tenderness   magdalene x 2

## 2020-12-23 ENCOUNTER — TRANSCRIPTION ENCOUNTER (OUTPATIENT)
Age: 65
End: 2020-12-23

## 2020-12-23 VITALS
RESPIRATION RATE: 16 BRPM | TEMPERATURE: 99 F | SYSTOLIC BLOOD PRESSURE: 126 MMHG | DIASTOLIC BLOOD PRESSURE: 62 MMHG | HEART RATE: 77 BPM

## 2020-12-23 LAB — GLUCOSE BLDC GLUCOMTR-MCNC: 152 MG/DL — HIGH (ref 70–99)

## 2020-12-23 PROCEDURE — 99233 SBSQ HOSP IP/OBS HIGH 50: CPT

## 2020-12-23 RX ORDER — CELECOXIB 200 MG/1
1 CAPSULE ORAL
Qty: 28 | Refills: 0
Start: 2020-12-23 | End: 2021-01-05

## 2020-12-23 RX ORDER — ASPIRIN/CALCIUM CARB/MAGNESIUM 324 MG
1 TABLET ORAL
Qty: 84 | Refills: 0
Start: 2020-12-23 | End: 2021-02-02

## 2020-12-23 RX ORDER — SENNA PLUS 8.6 MG/1
2 TABLET ORAL
Qty: 0 | Refills: 0 | DISCHARGE
Start: 2020-12-23

## 2020-12-23 RX ORDER — PANTOPRAZOLE SODIUM 20 MG/1
1 TABLET, DELAYED RELEASE ORAL
Qty: 30 | Refills: 1
Start: 2020-12-23 | End: 2021-02-20

## 2020-12-23 RX ORDER — TRAMADOL HYDROCHLORIDE 50 MG/1
1 TABLET ORAL
Qty: 42 | Refills: 0
Start: 2020-12-23 | End: 2020-12-29

## 2020-12-23 RX ORDER — ASPIRIN/CALCIUM CARB/MAGNESIUM 324 MG
1 TABLET ORAL
Qty: 0 | Refills: 0 | DISCHARGE

## 2020-12-23 RX ORDER — ACETAMINOPHEN 500 MG
2 TABLET ORAL
Qty: 0 | Refills: 0 | DISCHARGE
Start: 2020-12-23

## 2020-12-23 RX ADMIN — Medication 650 MILLIGRAM(S): at 11:30

## 2020-12-23 RX ADMIN — LOSARTAN POTASSIUM 100 MILLIGRAM(S): 100 TABLET, FILM COATED ORAL at 05:12

## 2020-12-23 RX ADMIN — Medication 650 MILLIGRAM(S): at 05:12

## 2020-12-23 RX ADMIN — Medication 4 MILLIGRAM(S): at 08:26

## 2020-12-23 RX ADMIN — Medication 650 MILLIGRAM(S): at 11:22

## 2020-12-23 RX ADMIN — Medication 15 MILLIGRAM(S): at 05:12

## 2020-12-23 RX ADMIN — PANTOPRAZOLE SODIUM 40 MILLIGRAM(S): 20 TABLET, DELAYED RELEASE ORAL at 05:12

## 2020-12-23 RX ADMIN — Medication 650 MILLIGRAM(S): at 05:11

## 2020-12-23 RX ADMIN — SODIUM CHLORIDE 100 MILLILITER(S): 9 INJECTION INTRAMUSCULAR; INTRAVENOUS; SUBCUTANEOUS at 05:12

## 2020-12-23 RX ADMIN — Medication 15 MILLIGRAM(S): at 11:22

## 2020-12-23 RX ADMIN — Medication 325 MILLIGRAM(S): at 11:22

## 2020-12-23 RX ADMIN — AMLODIPINE BESYLATE 5 MILLIGRAM(S): 2.5 TABLET ORAL at 05:12

## 2020-12-23 RX ADMIN — Medication 15 MILLIGRAM(S): at 05:11

## 2020-12-23 RX ADMIN — Medication 81 MILLIGRAM(S): at 09:22

## 2020-12-23 RX ADMIN — Medication 1 TABLET(S): at 11:22

## 2020-12-23 RX ADMIN — Medication 15 MILLIGRAM(S): at 11:30

## 2020-12-23 NOTE — DISCHARGE NOTE PROVIDER - NSDCFUSCHEDAPPT_GEN_ALL_CORE_FT
INGRID JENSEN ; 01/05/2021 ; CHELSY [facility] [address] ; 02/02/2021 ; CHELSY Sol[facility] [address]

## 2020-12-23 NOTE — DISCHARGE NOTE PROVIDER - NSDCMRMEDTOKEN_GEN_ALL_CORE_FT
acetaminophen 325 mg oral tablet: 2 tab(s) orally every 6 hours x 14 days  amLODIPine: tab(s) orally once a day  aspirin 81 mg oral delayed release tablet: 1 tab(s) orally every 12 hours  celecoxib 200 mg oral capsule: 1 cap(s) orally every 12 hours  losartan: tab(s) orally once a day  Multiple Vitamins oral tablet: 1 tab(s) orally once a day  pantoprazole 40 mg oral delayed release tablet: 1 tab(s) orally once a day (before a meal)  senna oral tablet: 2 tab(s) orally once a day (at bedtime)  traMADol 50 mg oral tablet: 1 tab(s) orally every 4 hours, As needed, Severe Pain (7 - 10) MDD:6 tablets

## 2020-12-23 NOTE — PROGRESS NOTE ADULT - ASSESSMENT
64M PMHx HTN, osteoarthritis here for elective L MARILEE.    #S/p L MARILEE  pain control  PT  discharge planning  #HTN  norvasc 5  losartan 100  #DVT ppx  per ortho    Leydi  [id number]

## 2020-12-23 NOTE — DISCHARGE NOTE PROVIDER - HOSPITAL COURSE
64 year old male with a past medical history of HTN , admitted for an elective Total Hip Arthroplasty . Routine post operative course.

## 2020-12-23 NOTE — DISCHARGE NOTE PROVIDER - CARE PROVIDER_API CALL
Taye Easton  Orthopedic Surgery  [address], [address]  [place] [address]  Phone: [phone]  Fax: [phone]  Follow Up Time:

## 2020-12-23 NOTE — OCCUPATIONAL THERAPY INITIAL EVALUATION ADULT - GENERAL OBSERVATIONS, REHAB EVAL
09:15-09:38 chart reviewed, ok to treat by Occupational Therapist as confirmed by RN, Pt received seated in bedside chair +aquacel dressing left hip in NAD. Pt reports 6/10 pain but is in agreement with OT IE

## 2020-12-23 NOTE — DISCHARGE NOTE NURSING/CASE MANAGEMENT/SOCIAL WORK - PATIENT PORTAL LINK FT
You can access the FollowMyHealth Patient Portal offered by [facility] by registering at the following website: [email or web address]. By joining [employer]’s FollowMyHealth portal, you will also be able to view your health information using other applications (apps) compatible with our system.

## 2020-12-23 NOTE — DISCHARGE NOTE PROVIDER - NSDCFUADDINST_GEN_ALL_CORE_FT
Keep surgical site clean and dry, may remove dressing in 7  days . Call                if any wound drainage, redness , increasing pain, fevers over 101 or if you have any questions or concerns.     You may shower with the bandage on and once it is removed. Once it is removed  , do not scrub surgical site. Do not apply any lotions/moisturizers/creams to surgical site.    Call to make your  post op appointment if you do not have one already.     After completing 6 weeks of aspirin 81 mg twice daily , you may resume your usual once daily dosage.

## 2020-12-23 NOTE — PROGRESS NOTE ADULT - SUBJECTIVE AND OBJECTIVE BOX
INTERVAL HPI/OVERNIGHT EVENTS:    SUBJECTIVE: Patient seen and examined at bedside.     no cp, sob, abd pain, fever  L hip pain, sharp, nonradiating, worsen with movement    OBJECTIVE:    VITAL SIGNS:  Vital Signs Last 24 Hrs  T(C): 37.1 (23 Dec 2020 10:00), Max: 37.1 (23 Dec 2020 10:00)  T(F): 98.7 (23 Dec 2020 10:00), Max: 98.7 (23 Dec 2020 10:00)  HR: 77 (23 Dec 2020 10:00) (77 - 86)  BP: 126/62 (23 Dec 2020 10:00) (119/62 - 147/69)  BP(mean): --  RR: 16 (23 Dec 2020 10:00) (16 - 18)  SpO2: --      PHYSICAL EXAM:    General: NAD  HEENT: NC/AT; PERRL, clear conjunctiva  Neck: supple  Respiratory: CTA b/l  Cardiovascular: +S1/S2; RRR  Abdomen: soft, NT/ND; +BS x4  Extremities: WWP, 2+ peripheral pulses b/l; no LE edema  Skin: bandaged L hip  Neurological:    MEDICATIONS:  MEDICATIONS  (STANDING):  acetaminophen   Tablet .. 650 milliGRAM(s) Oral every 6 hours  amLODIPine   Tablet 5 milliGRAM(s) Oral daily  aspirin enteric coated 81 milliGRAM(s) Oral every 12 hours  celecoxib 200 milliGRAM(s) Oral every 12 hours  chlorhexidine 4% Liquid 1 Application(s) Topical <User Schedule>  ferrous    sulfate 325 milliGRAM(s) Oral daily  losartan 100 milliGRAM(s) Oral daily  multivitamin 1 Tablet(s) Oral daily  multivitamin 1 Tablet(s) Oral daily  pantoprazole    Tablet 40 milliGRAM(s) Oral before breakfast  polyethylene glycol 3350 17 Gram(s) Oral at bedtime  senna 2 Tablet(s) Oral at bedtime    MEDICATIONS  (PRN):  aluminum hydroxide/magnesium hydroxide/simethicone Suspension 30 milliLiter(s) Oral four times a day PRN Indigestion  ondansetron Injectable 4 milliGRAM(s) IV Push every 6 hours PRN Nausea and/or Vomiting  traMADol 50 milliGRAM(s) Oral every 4 hours PRN Severe Pain (7 - 10)      ALLERGIES:  Allergies    No Known Allergies    Intolerances        LABS:                        12.8   14.24 )-----------( 226      ( 22 Dec 2020 13:24 )             38.5     Hemoglobin: 12.8 g/dL (12-22 @ 13:24)    CBC Full  -  ( 22 Dec 2020 13:24 )  WBC Count : 14.24 K/uL  RBC Count : 4.52 M/uL  Hemoglobin : 12.8 g/dL  Hematocrit : 38.5 %  Platelet Count - Automated : 226 K/uL  Mean Cell Volume : 85.2 fL  Mean Cell Hemoglobin : 28.3 pg  Mean Cell Hemoglobin Concentration : 33.2 g/dL  Auto Neutrophil # : x  Auto Lymphocyte # : x  Auto Monocyte # : x  Auto Eosinophil # : x  Auto Basophil # : x  Auto Neutrophil % : x  Auto Lymphocyte % : x  Auto Monocyte % : x  Auto Eosinophil % : x  Auto Basophil % : x          Creatinine Trend: 0.7<--        hs Troponin:              CSF:                      EKG:   MICROBIOLOGY:    IMAGING:      Labs, imaging, EKG personally reviewed    RADIOLOGY & ADDITIONAL TESTS: Reviewed.

## 2020-12-31 LAB — SURGICAL PATHOLOGY STUDY: SIGNIFICANT CHANGE UP

## 2021-01-05 ENCOUNTER — APPOINTMENT (OUTPATIENT)
Dept: ORTHOPEDIC SURGERY | Facility: CLINIC | Age: 66
End: 2021-01-05
Payer: COMMERCIAL

## 2021-01-05 DIAGNOSIS — Z98.890 OTHER SPECIFIED POSTPROCEDURAL STATES: ICD-10-CM

## 2021-01-05 DIAGNOSIS — Z51.89 ENCOUNTER FOR OTHER SPECIFIED AFTERCARE: ICD-10-CM

## 2021-01-05 PROCEDURE — 99024 POSTOP FOLLOW-UP VISIT: CPT

## 2021-01-05 NOTE — REASON FOR VISIT
[Post Operative Visit] : a post operative visit for [Artificial Hip Joint] : artificial hip joint [Joint Replacement Surgery, Aftercare] : joint replacement surgery, aftercare

## 2021-01-05 NOTE — HISTORY OF PRESENT ILLNESS
[de-identified] : 65 year old male s/p left total hip replacement presents to the office today for his 2 week follow up. Patient is ambulating with a walker today. He reports taking Tylenol for pain mostly and Tramadol at night only with good pain control. He has continued to do physical therapy at home and will be completing this within the week. Patient states he has been taking Aspirin 81 mg twice daily for DVT prophylaxis. Patient denies any fevers, chills, drainage from the incision site, chest pain, or shortness of breath.\par

## 2021-01-05 NOTE — PHYSICAL EXAM
[Walker] : ambulates with walker [Normal] : Alert and in no acute distress [de-identified] : Left Hip\par Inspection: No effusion, 2+ pitting edema about the left foot\par Wounds: Incision is clean and dry\par Alignment: Normal\par Stability: No instability\par Palpation: No specific tenderness on palpation\par Muscle Test: 5/5 All motor groups\par Leg examination: Calf is soft and non-tender. [de-identified] : Sensation grossly intact about bilateral lower extremities.\par

## 2021-01-05 NOTE — ASSESSMENT
[FreeTextEntry1] : 65 year old male approximately two weeks status post left total hip replacement. Pt is to continue Aspirin 81 mg twice daily for DVT prophylaxis. Pt is to follow up in four weeks for reassessment. In the interim, if he develops any fevers, erythema, drainage from the incision site, or any concerning symptoms, we will see him prior to his scheduled appointment.\par

## 2021-01-14 LAB
DNA PLOIDY SPEC FC-IMP: SIGNIFICANT CHANGE UP
DNA PLOIDY SPEC FC-IMP: SIGNIFICANT CHANGE UP

## 2021-02-02 ENCOUNTER — APPOINTMENT (OUTPATIENT)
Dept: ORTHOPEDIC SURGERY | Facility: CLINIC | Age: 66
End: 2021-02-02

## 2021-02-05 ENCOUNTER — APPOINTMENT (OUTPATIENT)
Dept: ORTHOPEDIC SURGERY | Facility: CLINIC | Age: 66
End: 2021-02-05
Payer: COMMERCIAL

## 2021-02-05 VITALS — TEMPERATURE: 98.3 F

## 2021-02-05 DIAGNOSIS — Z96.642 PRESENCE OF LEFT ARTIFICIAL HIP JOINT: ICD-10-CM

## 2021-02-05 PROCEDURE — 99024 POSTOP FOLLOW-UP VISIT: CPT

## 2021-02-05 PROCEDURE — 73502 X-RAY EXAM HIP UNI 2-3 VIEWS: CPT | Mod: LT

## 2021-02-05 RX ORDER — MELOXICAM 15 MG/1
15 TABLET ORAL
Qty: 30 | Refills: 0 | Status: DISCONTINUED | COMMUNITY
Start: 2020-03-19 | End: 2021-02-05

## 2021-02-05 RX ORDER — ACETAMINOPHEN 500 MG
500 TABLET ORAL
Refills: 0 | Status: DISCONTINUED | COMMUNITY
End: 2021-02-05

## 2021-02-05 RX ORDER — IBUPROFEN 200 MG/1
200 CAPSULE, LIQUID FILLED ORAL
Refills: 0 | Status: DISCONTINUED | COMMUNITY
End: 2021-02-05

## 2021-02-05 RX ORDER — TRAMADOL HYDROCHLORIDE 50 MG/1
50 TABLET, COATED ORAL
Qty: 42 | Refills: 0 | Status: ACTIVE | COMMUNITY
Start: 2020-12-23

## 2021-02-05 NOTE — ASSESSMENT
[FreeTextEntry1] : S/p [facility] 12/22/20 , doing well. Complains of limited ROM and difficulty putting on shoes and socks. He is walking with out a limp and without a cane. He can go up and down the stairs. We will see him back in 3 months.

## 2021-02-05 NOTE — PHYSICAL EXAM
[de-identified] : Left Knee\par Inspection: no effusion or erythema.\par Wounds: well healed incision \par Alignment: normal.\par Palpation: no specific tenderness on palpation.\par ROM: Flexion to 70 degrees,ER 30,ABD 30\par Ligamentous laxity: all ligaments appear stable\par Muscle Test: good quad strength.\par Leg examination: calf is soft and non-tender. [de-identified] : Radiographs done today AP pelvis and lateral of the left hip shows the bony structures are intact , well aligned cementless total hip replacement. Severely arthritis right hip.

## 2021-03-22 ENCOUNTER — FORM ENCOUNTER (OUTPATIENT)
Age: 66
End: 2021-03-22

## 2021-04-09 ENCOUNTER — APPOINTMENT (OUTPATIENT)
Dept: ORTHOPEDIC SURGERY | Facility: CLINIC | Age: 66
End: 2021-04-09

## 2023-03-24 NOTE — PHYSICAL THERAPY INITIAL EVALUATION ADULT - GAIT DISTANCE, PT EVAL
Rx Refill Note  Requested Prescriptions     Pending Prescriptions Disp Refills   • Insulin Pen Needle (BD Pen Needle Rimma 2nd Gen) 32G X 4 MM misc [Pharmacy Med Name: BD Pen Needle Rimma 2nd Gen Miscellaneous 32G X 4 MM] 200 each 0     Sig: USE TWICE DAILY AS DIRECTED FOR INSULIN INJECTIONS      Last office visit with prescribing clinician: 12/6/2022     Next office visit with prescribing clinician: 4/12/2023                         Would you like a call back once the refill request has been completed: [] Yes [] No    If the office needs to give you a call back, can they leave a voicemail: [] Yes [] No    Vesna Steven MA  03/24/23, 09:40 EDT  
60' x 2

## 2024-06-18 NOTE — PHYSICAL THERAPY INITIAL EVALUATION ADULT - BALANCE DISTURBANCE, IDENTIFIED IMPAIRMENT CONTRIBUTE, REHAB EVAL
Discontinuing liver care coordination program as [place], plans to transfer care to new hepatologist.    Salome Marie, RN Care Coordinator  [facility]  Hepatology Clinic/Specialty Program     impaired coordination/impaired postural control/decreased strength

## 2025-06-17 NOTE — H&P PST ADULT - HEIGHT IN FEET
"I, Mekhi Morse MD, saw and evaluated the patient. I have discussed the patient with the resident and agree with the resident's findings, Plan of Care, and MDM as documented in the resident's note, except where noted. All available labs and Radiology studies were reviewed.  I was present for key portions of any procedure(s) performed by the resident and I was immediately available to provide assistance.    At this point I agree with the current assessment done in the Emergency Department.  I have conducted an independent evaluation of this patient a history and physical is as follows:    62 yo male with a history of major depressive disorder, PTSD, polysubstance abuse, CKD, GERD, DM, and hyperlipidemia presents to the ED for evaluation of an abnormal lab value. The patient was seen at a local clinic today for a routine checkup. The provider he saw noticed that outpatient blood work from 6/2 showed a potassium of 6.4 so the patient was instructed to come to the ED immediately for assessment. He has absolutely no complaints and says he feels \"fine\". Of note, he was in the ED on 6/3 and had repeat labs drawn --> potassium was 5.3 at that time. Apparently the clinic does not have access to these records. No chest pain, shortness of breath, nausea, vomiting, dizziness, or lightheadedness. No other specific complaints.    ROS: per resident physician note    Gen: NAD, AA&Ox3  HEENT: PERRL, EOMI  Neck: supple  CV: RRR  Lungs: CTA B/L  Abdomen: soft, NT/ND  Ext: no swelling or deformity  Neuro: 5/5 strength all extremities, sensation grossly intact  Skin: no rash    ED Course  The patient is very well appearing with stable vital signs and a benign physical examination. He is entirely asymptomatic but understandably concerned about his elevated potassium for several weeks ago. Will check EKG and repeat BMP. If workup unremarkable then plan for reassurance and discharge to home. The patient is agreeable to this " plan.      Critical Care Time  Procedures    5